# Patient Record
Sex: FEMALE | Race: WHITE | NOT HISPANIC OR LATINO
[De-identification: names, ages, dates, MRNs, and addresses within clinical notes are randomized per-mention and may not be internally consistent; named-entity substitution may affect disease eponyms.]

---

## 2017-02-03 PROCEDURE — 87591 N.GONORRHOEAE DNA AMP PROB: CPT | Performed by: OBSTETRICS & GYNECOLOGY

## 2017-02-03 PROCEDURE — 88175 CYTOPATH C/V AUTO FLUID REDO: CPT | Performed by: OBSTETRICS & GYNECOLOGY

## 2017-02-03 PROCEDURE — 87491 CHLMYD TRACH DNA AMP PROBE: CPT | Performed by: OBSTETRICS & GYNECOLOGY

## 2017-09-01 ENCOUNTER — LAB SERVICES (OUTPATIENT)
Dept: OTHER | Age: 22
End: 2017-09-01

## 2017-09-01 ENCOUNTER — CHARTING TRANS (OUTPATIENT)
Dept: OTHER | Age: 22
End: 2017-09-01

## 2017-09-01 LAB — RAPID STREP GROUP A: POSITIVE

## 2017-10-10 ENCOUNTER — CHARTING TRANS (OUTPATIENT)
Dept: OTHER | Age: 22
End: 2017-10-10

## 2017-10-29 ENCOUNTER — CHARTING TRANS (OUTPATIENT)
Dept: OTHER | Age: 22
End: 2017-10-29

## 2017-10-29 ENCOUNTER — LAB SERVICES (OUTPATIENT)
Dept: OTHER | Age: 22
End: 2017-10-29

## 2017-10-29 LAB — RAPID STREP GROUP A: POSITIVE

## 2017-12-19 PROBLEM — Z30.41 SURVEILLANCE OF CONTRACEPTIVE PILL: Status: ACTIVE | Noted: 2017-12-19

## 2017-12-19 PROCEDURE — 87491 CHLMYD TRACH DNA AMP PROBE: CPT | Performed by: OBSTETRICS & GYNECOLOGY

## 2017-12-19 PROCEDURE — 87591 N.GONORRHOEAE DNA AMP PROB: CPT | Performed by: OBSTETRICS & GYNECOLOGY

## 2018-10-29 ENCOUNTER — CHARTING TRANS (OUTPATIENT)
Dept: OTHER | Age: 23
End: 2018-10-29

## 2018-10-29 ASSESSMENT — PAIN SCALES - GENERAL: PAINLEVEL_OUTOF10: 6

## 2018-11-02 VITALS
DIASTOLIC BLOOD PRESSURE: 88 MMHG | HEIGHT: 62 IN | TEMPERATURE: 98 F | SYSTOLIC BLOOD PRESSURE: 120 MMHG | HEART RATE: 92 BPM | RESPIRATION RATE: 18 BRPM | BODY MASS INDEX: 28.05 KG/M2 | WEIGHT: 152.45 LBS | OXYGEN SATURATION: 99 %

## 2018-11-02 VITALS
DIASTOLIC BLOOD PRESSURE: 62 MMHG | OXYGEN SATURATION: 97 % | WEIGHT: 148 LBS | RESPIRATION RATE: 16 BRPM | SYSTOLIC BLOOD PRESSURE: 116 MMHG | HEART RATE: 88 BPM | TEMPERATURE: 98.7 F

## 2018-11-03 VITALS
TEMPERATURE: 99 F | HEART RATE: 116 BPM | OXYGEN SATURATION: 96 % | HEIGHT: 62 IN | RESPIRATION RATE: 18 BRPM | BODY MASS INDEX: 24.06 KG/M2 | DIASTOLIC BLOOD PRESSURE: 76 MMHG | SYSTOLIC BLOOD PRESSURE: 122 MMHG | WEIGHT: 130.73 LBS

## 2018-11-27 VITALS
HEART RATE: 96 BPM | TEMPERATURE: 97.9 F | OXYGEN SATURATION: 97 % | DIASTOLIC BLOOD PRESSURE: 80 MMHG | RESPIRATION RATE: 18 BRPM | SYSTOLIC BLOOD PRESSURE: 131 MMHG

## 2018-11-29 ENCOUNTER — LAB SERVICES (OUTPATIENT)
Dept: OTHER | Age: 23
End: 2018-11-29

## 2018-11-29 LAB
ALBUMIN: 5 GM/DL (ref 3.5–5)
ALKALINE PHOSPHATASE: 62 U/L (ref 38–126)
ALT: 14 U/L (ref 4–34)
APPEARANCE, URINE: CLEAR
AST: 22 U/L (ref 14–40)
BASO #: 0.06 K/UL (ref 0–0.2)
BASO %: 1 % (ref 0–2)
BILIRUBIN TOTAL: 0.4 MG/DL (ref 0.2–1.3)
BILIRUBIN-URINE: NORMAL
BUN SERPL-MCNC: 10 MG/DL (ref 7–17)
CALCIUM: 9.9 MG/DL (ref 8.4–10.2)
CARBON DIOXIDE: 23 MMOL/L (ref 22–30)
CHLORIDE: 106 MMOL/L (ref 98–107)
COLOR, URINE: YELLOW
CREATININE: 0.64 MG/DL (ref 0.52–1.04)
EGFR FOR AFRICAN AMERICANS: >60
EGFR FOR NON-AFRICAN AMERICANS: >60
EOS #: 0.12 K/UL (ref 0–0.5)
EOS %: 1 % (ref 0–5)
GLUCOSE URINE-UA: NORMAL
GLUCOSE: 98 MG/DL (ref 70–99)
HEMATOCRIT: 39 % (ref 37–47)
HEMOGLOBIN: 13.3 GM/DL (ref 12–16)
KETONE-URINE: NORMAL
LIPASE: 89 U/L (ref 23–300)
LYMPH #: 3.18 K/UL (ref 1–4.8)
LYMPH %: 31 % (ref 22–44)
MEAN CORPUSCULAR HEMOGLOBIN: 29.9 PG/CELL (ref 27–35)
MEAN CORPUSCULAR HGB CONC: 34.2 G/DL (ref 32–36)
MEAN CORPUSCULAR VOLUME: 87.7 FL (ref 81–102)
MEAN PLATELET VOLUME: 8 FL (ref 6.7–10.4)
MONO #: 0.71 K/UL (ref 0–0.8)
MONO %: 7 % (ref 2–10)
NEUTROPHILS #: 6.25 K/UL (ref 1.8–7.7)
NEUTROPHILS %: 61 % (ref 40–70)
NITRITE-URINE: NORMAL
OCCULT BLOOD URINE: NORMAL
PH-URINE: 6.5 (ref 5–8)
PLATELET COUNT: 341 K/UL (ref 145–375)
POTASSIUM SERPL-SCNC: 3.7 MMOL/L (ref 3.5–5)
PROTEIN-URINE-DIP: NORMAL
RED CELL COUNT: 4.44 M/UL (ref 3.8–5.1)
RED CELL DISTRIBUTION WIDTH: 11.5 % (ref 11.5–14.5)
SODIUM: 139 MMOL/L (ref 136–145)
SPECIFIC GRAVITY-URINE: <=1.005 (ref 1.01–1.03)
TOTAL PROTEIN: 8.8 GM/DL (ref 6.3–8.3)
URINE LEUKOCYTE ESTERASE: NORMAL
UROBILINOGEN-URINE: NORMAL MG/DL (ref 0.2–1)
WHITE BLOOD COUNT: 10.3 K/UL (ref 4.8–10.8)

## 2019-02-16 ENCOUNTER — OFFICE VISIT (OUTPATIENT)
Dept: URGENT CARE | Age: 24
End: 2019-02-16
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
## 2019-02-16 DIAGNOSIS — S16.1XXA STRAIN OF NECK MUSCLE, INITIAL ENCOUNTER: Primary | ICD-10-CM

## 2019-02-16 PROCEDURE — 99213 OFFICE O/P EST LOW 20 MIN: CPT | Performed by: FAMILY MEDICINE

## 2019-02-16 RX ORDER — NORETHINDRONE ACETATE AND ETHINYL ESTRADIOL 1MG-20(21)
1 KIT ORAL
COMMUNITY
Start: 2018-02-05

## 2019-02-16 ASSESSMENT — PAIN SCALES - GENERAL: PAINLEVEL: 7-8

## 2019-05-13 ENCOUNTER — TELEPHONE (OUTPATIENT)
Dept: SCHEDULING | Age: 24
End: 2019-05-13

## 2019-05-13 ENCOUNTER — WALK IN (OUTPATIENT)
Dept: URGENT CARE | Age: 24
End: 2019-05-13

## 2019-05-13 DIAGNOSIS — Z23 NEED FOR VACCINATION FOR MENINGOCOCCUS: Primary | ICD-10-CM

## 2019-05-13 PROCEDURE — 90471 IMMUNIZATION ADMIN: CPT | Performed by: NURSE PRACTITIONER

## 2019-05-13 PROCEDURE — 90734 MENACWYD/MENACWYCRM VACC IM: CPT | Performed by: NURSE PRACTITIONER

## 2021-05-26 VITALS
DIASTOLIC BLOOD PRESSURE: 79 MMHG | HEART RATE: 72 BPM | RESPIRATION RATE: 16 BRPM | SYSTOLIC BLOOD PRESSURE: 131 MMHG | TEMPERATURE: 97.9 F | OXYGEN SATURATION: 98 %

## 2022-12-06 ENCOUNTER — NURSE ONLY (OUTPATIENT)
Dept: INTERNAL MEDICINE CLINIC | Facility: HOSPITAL | Age: 27
End: 2022-12-06
Attending: EMERGENCY MEDICINE

## 2022-12-06 DIAGNOSIS — Z00.00 WELLNESS EXAMINATION: Primary | ICD-10-CM

## 2022-12-06 PROCEDURE — 86480 TB TEST CELL IMMUN MEASURE: CPT

## 2022-12-07 LAB
M TB IFN-G CD4+ T-CELLS BLD-ACNC: 0.04 IU/ML
M TB TUBERC IFN-G BLD QL: NEGATIVE
M TB TUBERC IGNF/MITOGEN IGNF CONTROL: >10 IU/ML
QFT TB1 AG MINUS NIL: 0 IU/ML
QFT TB2 AG MINUS NIL: 0.01 IU/ML

## 2023-01-05 ENCOUNTER — TELEPHONE (OUTPATIENT)
Facility: CLINIC | Age: 28
End: 2023-01-05

## 2023-01-05 NOTE — TELEPHONE ENCOUNTER
IVELISSE explaining to patient that TK does not do IUD's and to see if she wanted to reschedule with a

## 2023-01-16 ENCOUNTER — OFFICE VISIT (OUTPATIENT)
Dept: OBGYN CLINIC | Facility: CLINIC | Age: 28
End: 2023-01-16
Payer: COMMERCIAL

## 2023-01-16 VITALS
SYSTOLIC BLOOD PRESSURE: 126 MMHG | HEART RATE: 71 BPM | BODY MASS INDEX: 32.86 KG/M2 | WEIGHT: 178.56 LBS | DIASTOLIC BLOOD PRESSURE: 80 MMHG | HEIGHT: 62 IN

## 2023-01-16 DIAGNOSIS — Z01.419 ENCOUNTER FOR WELL WOMAN EXAM WITH ROUTINE GYNECOLOGICAL EXAM: Primary | ICD-10-CM

## 2023-01-16 PROCEDURE — 87491 CHLMYD TRACH DNA AMP PROBE: CPT

## 2023-01-16 PROCEDURE — 87591 N.GONORRHOEAE DNA AMP PROB: CPT

## 2023-01-17 LAB
C TRACH DNA SPEC QL NAA+PROBE: NEGATIVE
N GONORRHOEA DNA SPEC QL NAA+PROBE: NEGATIVE